# Patient Record
Sex: FEMALE | Race: WHITE | Employment: FULL TIME | URBAN - METROPOLITAN AREA
[De-identification: names, ages, dates, MRNs, and addresses within clinical notes are randomized per-mention and may not be internally consistent; named-entity substitution may affect disease eponyms.]

---

## 2020-11-15 ENCOUNTER — APPOINTMENT (OUTPATIENT)
Dept: CT IMAGING | Age: 55
End: 2020-11-15
Attending: EMERGENCY MEDICINE
Payer: COMMERCIAL

## 2020-11-15 ENCOUNTER — HOSPITAL ENCOUNTER (OUTPATIENT)
Age: 55
Setting detail: OBSERVATION
Discharge: HOME | End: 2020-11-16
Attending: EMERGENCY MEDICINE | Admitting: INTERNAL MEDICINE
Payer: COMMERCIAL

## 2020-11-15 ENCOUNTER — HOSPITAL ENCOUNTER (OUTPATIENT)
Dept: MRI IMAGING | Age: 55
Setting detail: OBSERVATION
Discharge: HOME | End: 2020-11-15
Attending: INTERNAL MEDICINE
Payer: COMMERCIAL

## 2020-11-15 ENCOUNTER — APPOINTMENT (OUTPATIENT)
Dept: NON INVASIVE DIAGNOSTICS | Age: 55
End: 2020-11-15
Attending: INTERNAL MEDICINE
Payer: COMMERCIAL

## 2020-11-15 DIAGNOSIS — R25.2 CRAMPING OF HANDS: ICD-10-CM

## 2020-11-15 DIAGNOSIS — E78.5 DYSLIPIDEMIA: ICD-10-CM

## 2020-11-15 DIAGNOSIS — R47.81 SLURRED SPEECH: Primary | ICD-10-CM

## 2020-11-15 DIAGNOSIS — R20.2 PARESTHESIA: ICD-10-CM

## 2020-11-15 PROBLEM — D72.829 LEUKOCYTOSIS: Status: ACTIVE | Noted: 2020-11-15

## 2020-11-15 PROBLEM — G45.9 TIA (TRANSIENT ISCHEMIC ATTACK): Status: ACTIVE | Noted: 2020-11-15

## 2020-11-15 PROBLEM — R51.9 HEADACHE: Status: ACTIVE | Noted: 2020-11-15

## 2020-11-15 LAB
ALBUMIN SERPL-MCNC: 4.2 G/DL (ref 3.5–5)
ALBUMIN/GLOB SERPL: 1.1 {RATIO} (ref 1.1–2.2)
ALP SERPL-CCNC: 70 U/L (ref 45–117)
ALT SERPL-CCNC: 23 U/L (ref 12–78)
ANION GAP SERPL CALC-SCNC: 9 MMOL/L (ref 5–15)
AST SERPL-CCNC: 19 U/L (ref 15–37)
BASOPHILS # BLD: 0.1 K/UL (ref 0–0.1)
BASOPHILS NFR BLD: 0 % (ref 0–1)
BILIRUB SERPL-MCNC: 0.7 MG/DL (ref 0.2–1)
BUN SERPL-MCNC: 14 MG/DL (ref 6–20)
BUN/CREAT SERPL: 18 (ref 12–20)
CALCIUM SERPL-MCNC: 8.9 MG/DL (ref 8.5–10.1)
CHLORIDE SERPL-SCNC: 108 MMOL/L (ref 97–108)
CHOLEST SERPL-MCNC: 236 MG/DL
CO2 SERPL-SCNC: 25 MMOL/L (ref 21–32)
COMMENT, HOLDF: NORMAL
CREAT SERPL-MCNC: 0.79 MG/DL (ref 0.55–1.02)
DIFFERENTIAL METHOD BLD: ABNORMAL
ECHO AO ASC DIAM: 2.82 CM
ECHO AO ROOT DIAM: 2.82 CM
ECHO AV AREA PEAK VELOCITY: 2.89 CM2
ECHO AV AREA VTI: 2.86 CM2
ECHO AV AREA/BSA PEAK VELOCITY: 1.6 CM2/M2
ECHO AV AREA/BSA VTI: 1.6 CM2/M2
ECHO AV MEAN GRADIENT: 6.51 MMHG
ECHO AV PEAK GRADIENT: 13.83 MMHG
ECHO AV PEAK VELOCITY: 185.92 CM/S
ECHO AV VTI: 34.67 CM
ECHO IVC PROX: 1.44 CM
ECHO LA AREA 4C: 13.6 CM2
ECHO LA MAJOR AXIS: 3.08 CM
ECHO LA MINOR AXIS: 1.74 CM
ECHO LA VOL 2C: 38.2 ML (ref 22–52)
ECHO LA VOL 4C: 32.6 ML (ref 22–52)
ECHO LA VOL BP: 38.1 ML (ref 22–52)
ECHO LA VOL/BSA BIPLANE: 21.53 ML/M2 (ref 16–28)
ECHO LA VOLUME INDEX A2C: 21.58 ML/M2 (ref 16–28)
ECHO LA VOLUME INDEX A4C: 18.42 ML/M2 (ref 16–28)
ECHO LV E' LATERAL VELOCITY: 12.58 CENTIMETER/SECOND
ECHO LV E' SEPTAL VELOCITY: 10.62 CENTIMETER/SECOND
ECHO LV INTERNAL DIMENSION DIASTOLIC: 4.07 CM (ref 3.9–5.3)
ECHO LV INTERNAL DIMENSION SYSTOLIC: 2.35 CM
ECHO LV IVSD: 0.91 CM (ref 0.6–0.9)
ECHO LV MASS 2D: 128.7 G (ref 67–162)
ECHO LV MASS INDEX 2D: 72.7 G/M2 (ref 43–95)
ECHO LV POSTERIOR WALL DIASTOLIC: 1.07 CM (ref 0.6–0.9)
ECHO LVOT DIAM: 1.97 CM
ECHO LVOT PEAK GRADIENT: 10.93 MMHG
ECHO LVOT PEAK VELOCITY: 165.27 CM/S
ECHO LVOT SV: 99 ML
ECHO LVOT VTI: 32.5 CM
ECHO MV A VELOCITY: 93.52 CENTIMETER/SECOND
ECHO MV AREA PHT: 5.22 CM2
ECHO MV E DECELERATION TIME (DT): 145.26 MS
ECHO MV E VELOCITY: 80.89 CENTIMETER/SECOND
ECHO MV PRESSURE HALF TIME (PHT): 42.13 MS
ECHO PV MAX VELOCITY: 123.98 CM/S
ECHO PV PEAK INSTANTANEOUS GRADIENT SYSTOLIC: 6.16 MMHG
ECHO RV INTERNAL DIMENSION: 3.05 CM
ECHO RV TAPSE: 2.41 CM (ref 1.5–2)
ECHO TV REGURGITANT MAX VELOCITY: 165.85 CM/S
ECHO TV REGURGITANT PEAK GRADIENT: 11 MMHG
EOSINOPHIL # BLD: 0 K/UL (ref 0–0.4)
EOSINOPHIL NFR BLD: 0 % (ref 0–7)
ERYTHROCYTE [DISTWIDTH] IN BLOOD BY AUTOMATED COUNT: 11.9 % (ref 11.5–14.5)
EST. AVERAGE GLUCOSE BLD GHB EST-MCNC: 114 MG/DL
GLOBULIN SER CALC-MCNC: 3.7 G/DL (ref 2–4)
GLUCOSE BLD STRIP.AUTO-MCNC: 82 MG/DL (ref 65–100)
GLUCOSE SERPL-MCNC: 105 MG/DL (ref 65–100)
HBA1C MFR BLD: 5.6 % (ref 4–5.6)
HCT VFR BLD AUTO: 44.6 % (ref 35–47)
HDLC SERPL-MCNC: 68 MG/DL
HDLC SERPL: 3.5 {RATIO} (ref 0–5)
HGB BLD-MCNC: 15 G/DL (ref 11.5–16)
IMM GRANULOCYTES # BLD AUTO: 0 K/UL (ref 0–0.04)
IMM GRANULOCYTES NFR BLD AUTO: 0 % (ref 0–0.5)
LA VOL DISK BP: 36.21 ML (ref 22–52)
LDLC SERPL CALC-MCNC: 153.6 MG/DL (ref 0–100)
LIPID PROFILE,FLP: ABNORMAL
LVOT MG: 5.33 MMHG
LYMPHOCYTES # BLD: 1.2 K/UL (ref 0.8–3.5)
LYMPHOCYTES NFR BLD: 10 % (ref 12–49)
MAGNESIUM SERPL-MCNC: 1.8 MG/DL (ref 1.6–2.4)
MCH RBC QN AUTO: 31.8 PG (ref 26–34)
MCHC RBC AUTO-ENTMCNC: 33.6 G/DL (ref 30–36.5)
MCV RBC AUTO: 94.7 FL (ref 80–99)
MONOCYTES # BLD: 0.6 K/UL (ref 0–1)
MONOCYTES NFR BLD: 5 % (ref 5–13)
NEUTS SEG # BLD: 10.3 K/UL (ref 1.8–8)
NEUTS SEG NFR BLD: 85 % (ref 32–75)
NRBC # BLD: 0 K/UL (ref 0–0.01)
NRBC BLD-RTO: 0 PER 100 WBC
PLATELET # BLD AUTO: 338 K/UL (ref 150–400)
PMV BLD AUTO: 10.6 FL (ref 8.9–12.9)
POTASSIUM SERPL-SCNC: 3.4 MMOL/L (ref 3.5–5.1)
PROT SERPL-MCNC: 7.9 G/DL (ref 6.4–8.2)
RBC # BLD AUTO: 4.71 M/UL (ref 3.8–5.2)
SAMPLES BEING HELD,HOLD: NORMAL
SERVICE CMNT-IMP: NORMAL
SODIUM SERPL-SCNC: 142 MMOL/L (ref 136–145)
TRIGL SERPL-MCNC: 72 MG/DL (ref ?–150)
TSH SERPL DL<=0.05 MIU/L-ACNC: 1.11 UIU/ML (ref 0.36–3.74)
VLDLC SERPL CALC-MCNC: 14.4 MG/DL
WBC # BLD AUTO: 12.2 K/UL (ref 3.6–11)

## 2020-11-15 PROCEDURE — 99218 HC RM OBSERVATION: CPT

## 2020-11-15 PROCEDURE — 99285 EMERGENCY DEPT VISIT HI MDM: CPT

## 2020-11-15 PROCEDURE — 97161 PT EVAL LOW COMPLEX 20 MIN: CPT

## 2020-11-15 PROCEDURE — 70450 CT HEAD/BRAIN W/O DYE: CPT

## 2020-11-15 PROCEDURE — 97530 THERAPEUTIC ACTIVITIES: CPT

## 2020-11-15 PROCEDURE — 83036 HEMOGLOBIN GLYCOSYLATED A1C: CPT

## 2020-11-15 PROCEDURE — 97535 SELF CARE MNGMENT TRAINING: CPT

## 2020-11-15 PROCEDURE — 36415 COLL VENOUS BLD VENIPUNCTURE: CPT

## 2020-11-15 PROCEDURE — 96374 THER/PROPH/DIAG INJ IV PUSH: CPT

## 2020-11-15 PROCEDURE — 97116 GAIT TRAINING THERAPY: CPT

## 2020-11-15 PROCEDURE — 0042T CT PERF W CBF: CPT

## 2020-11-15 PROCEDURE — 95816 EEG AWAKE AND DROWSY: CPT | Performed by: PSYCHIATRY & NEUROLOGY

## 2020-11-15 PROCEDURE — 74011250636 HC RX REV CODE- 250/636: Performed by: INTERNAL MEDICINE

## 2020-11-15 PROCEDURE — 74011250636 HC RX REV CODE- 250/636: Performed by: EMERGENCY MEDICINE

## 2020-11-15 PROCEDURE — 93005 ELECTROCARDIOGRAM TRACING: CPT

## 2020-11-15 PROCEDURE — 85025 COMPLETE CBC W/AUTO DIFF WBC: CPT

## 2020-11-15 PROCEDURE — 97165 OT EVAL LOW COMPLEX 30 MIN: CPT

## 2020-11-15 PROCEDURE — 99205 OFFICE O/P NEW HI 60 MIN: CPT | Performed by: PSYCHIATRY & NEUROLOGY

## 2020-11-15 PROCEDURE — 80061 LIPID PANEL: CPT

## 2020-11-15 PROCEDURE — 96375 TX/PRO/DX INJ NEW DRUG ADDON: CPT

## 2020-11-15 PROCEDURE — 74011000636 HC RX REV CODE- 636: Performed by: RADIOLOGY

## 2020-11-15 PROCEDURE — 70496 CT ANGIOGRAPHY HEAD: CPT

## 2020-11-15 PROCEDURE — 84443 ASSAY THYROID STIM HORMONE: CPT

## 2020-11-15 PROCEDURE — 74011250637 HC RX REV CODE- 250/637: Performed by: INTERNAL MEDICINE

## 2020-11-15 PROCEDURE — 80053 COMPREHEN METABOLIC PANEL: CPT

## 2020-11-15 PROCEDURE — 94760 N-INVAS EAR/PLS OXIMETRY 1: CPT

## 2020-11-15 PROCEDURE — 83735 ASSAY OF MAGNESIUM: CPT

## 2020-11-15 PROCEDURE — 70551 MRI BRAIN STEM W/O DYE: CPT

## 2020-11-15 PROCEDURE — 82962 GLUCOSE BLOOD TEST: CPT

## 2020-11-15 RX ORDER — FAMOTIDINE 20 MG/1
20 TABLET, FILM COATED ORAL EVERY 12 HOURS
Status: DISCONTINUED | OUTPATIENT
Start: 2020-11-15 | End: 2020-11-16 | Stop reason: HOSPADM

## 2020-11-15 RX ORDER — POTASSIUM CHLORIDE 750 MG/1
40 TABLET, FILM COATED, EXTENDED RELEASE ORAL EVERY 4 HOURS
Status: DISPENSED | OUTPATIENT
Start: 2020-11-15 | End: 2020-11-15

## 2020-11-15 RX ORDER — GUAIFENESIN 100 MG/5ML
81 LIQUID (ML) ORAL DAILY
Status: DISCONTINUED | OUTPATIENT
Start: 2020-11-15 | End: 2020-11-16 | Stop reason: HOSPADM

## 2020-11-15 RX ORDER — SODIUM CHLORIDE 9 MG/ML
75 INJECTION, SOLUTION INTRAVENOUS CONTINUOUS
Status: DISCONTINUED | OUTPATIENT
Start: 2020-11-15 | End: 2020-11-16

## 2020-11-15 RX ORDER — LORAZEPAM 2 MG/ML
1 INJECTION INTRAMUSCULAR
Status: COMPLETED | OUTPATIENT
Start: 2020-11-15 | End: 2020-11-15

## 2020-11-15 RX ORDER — ACETAMINOPHEN 325 MG/1
650 TABLET ORAL
Status: DISCONTINUED | OUTPATIENT
Start: 2020-11-15 | End: 2020-11-16 | Stop reason: HOSPADM

## 2020-11-15 RX ORDER — LABETALOL HCL 20 MG/4 ML
5 SYRINGE (ML) INTRAVENOUS
Status: DISCONTINUED | OUTPATIENT
Start: 2020-11-15 | End: 2020-11-16 | Stop reason: HOSPADM

## 2020-11-15 RX ORDER — ACETAMINOPHEN 650 MG/1
650 SUPPOSITORY RECTAL
Status: DISCONTINUED | OUTPATIENT
Start: 2020-11-15 | End: 2020-11-16 | Stop reason: HOSPADM

## 2020-11-15 RX ADMIN — SODIUM CHLORIDE 75 ML/HR: 900 INJECTION, SOLUTION INTRAVENOUS at 09:29

## 2020-11-15 RX ADMIN — IOPAMIDOL 140 ML: 755 INJECTION, SOLUTION INTRAVENOUS at 09:19

## 2020-11-15 RX ADMIN — FAMOTIDINE 20 MG: 20 TABLET, FILM COATED ORAL at 21:20

## 2020-11-15 RX ADMIN — POTASSIUM CHLORIDE 40 MEQ: 750 TABLET, FILM COATED, EXTENDED RELEASE ORAL at 13:30

## 2020-11-15 RX ADMIN — ASPIRIN 81 MG: 81 TABLET, CHEWABLE ORAL at 13:30

## 2020-11-15 RX ADMIN — LORAZEPAM 1 MG: 2 INJECTION INTRAMUSCULAR; INTRAVENOUS at 09:09

## 2020-11-15 NOTE — ED NOTES
TRANSFER - OUT REPORT:    Verbal report given to 5th floor RN (name) on [facility]  being transferred to medical (unit) for routine progression of care       Report consisted of patients Situation, Background, Assessment and   Recommendations(SBAR). Information from the following report(s) SBAR, ED Summary, MAR, Recent Results and Cardiac Rhythm NSR  was reviewed with the receiving nurse. Lines:   Peripheral IV 11/15/20 Right Antecubital (Active)   Site Assessment Clean, dry, & intact 11/15/20 0818   Phlebitis Assessment 0 11/15/20 0818   Infiltration Assessment 0 11/15/20 0818   Dressing Status Clean, dry, & intact 11/15/20 0818   Dressing Type Tape;Transparent 11/15/20 0818   Hub Color/Line Status Pink;Flushed;Patent 11/15/20 0818   Action Taken Blood drawn 11/15/20 0818        Opportunity for questions and clarification was provided. Patient transported with:   Tech     Pt to go to MRI at this time first, MRI to arrange transport to [facility], spoke to MRI tech.
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
## 2020-11-15 NOTE — PROGRESS NOTES
Occupational Therapy    Acknowledge OT orders and completed chart review. Attempted to see patient for OT. Patient currently having ECHO. Will continue to follow for OT services as medically appropriate.       Thank you,    Elvin Turner OTR/L

## 2020-11-15 NOTE — PROGRESS NOTES
CM Note:  Met with pt for d/c planning and provided Obs letter. PTA pt was independent with ADL's and drove. Takes no meds but can use [employer] in [place]. Reason for Admission:   TIA                   RUR Score:          N/A           Plan for utilizing home health: To be determined    PCP: First and Last name:  Lorraine Lynch   Name of Practice:    Are you a current patient: Yes/No:  yes   Approximate date of last visit:   About 1 year ago   Can you participate in a virtual visit with your PCP:  Doesn't know                    Current Advanced Directive/Advance Care Plan: none; her children are her next of kin                         Transition of Care Plan:                    1. Neuro consult  2. PT/OT/SLP consults  3. CM following for any needs  4. Aditya Leblanc, to transport her home at d/c  STACY Foster RN    Care Management Interventions  PCP Verified by CM: Yes  Mode of Transport at Discharge:  Other (see comment)(nazanin)  Transition of Care Consult (CM Consult): Discharge Planning  Physical Therapy Consult: Yes  Occupational Therapy Consult: Yes  Speech Therapy Consult: Yes  Current Support Network: Own Home, Other(lives with her Yoli leblanc)  Confirm Follow Up Transport: Other (see comment)(nazanin)  Discharge Location  Discharge Placement: Unable to determine at this time
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
## 2020-11-15 NOTE — PROGRESS NOTES
PHYSICAL THERAPY EVALUATION/DISCHARGE  Patient: Jayda Jennings (60 y.o. female)  Date: 11/15/2020  Primary Diagnosis: TIA (transient ischemic attack) [G45.9]       Precautions: universal         ASSESSMENT  Based on the objective data described below, the patient presents with independent with ambulation without assistive device and independent with all functional mobility. Reviewed sign and symptoms of stroke with the patient and verbalized understanding. Reviewed all safety precaution and home exercise program with the patient, verbalized understanding, clear to go home per Physical Therapy perspective. Skilled physical therapy is not indicated at this time. Functional Outcome Measure: The patient scored Total: 56/56 on the Roberts Balance Assessment which is indicative of low fall risk. Other factors to consider for discharge: none     Further skilled acute physical therapy is not indicated at this time. PLAN :  Recommendation for discharge: (in order for the patient to meet his/her long term goals)  No skilled physical therapy/ follow up rehabilitation needs identified at this time. This discharge recommendation:  Has been made in collaboration with the attending provider and/or case management    IF patient discharges home will need the following DME: none         SUBJECTIVE:   Patient stated I feel tired form all the test done today.     OBJECTIVE DATA SUMMARY:   HISTORY:    Past Medical History:   Diagnosis Date    Dysmenorrhea     Gross hematuria     Headache(784.0)     Irregular menses     Microscopic hematuria     PONV (postoperative nausea and vomiting)      Past Surgical History:   Procedure Laterality Date    HX CYSTECTOMY      hx ovarian    HX DILATION AND CURETTAGE         Prior level of function: Independent community ambulator without assistive device.   Personal factors and/or comorbidities impacting plan of care:     Home Situation  Home Environment: Private residence  # Steps to Enter: 4  One/Two Story Residence: One story  Living Alone: No  Support Systems: Spouse/Significant Other/Partner  Patient Expects to be Discharged to[de-identified] Private residence  Current DME Used/Available at Home: None  Tub or Shower Type: Tub/Shower combination    EXAMINATION/PRESENTATION/DECISION MAKING:   Critical Behavior:  Neurologic State: Alert  Orientation Level: Oriented X4  Cognition: Appropriate for age attention/concentration, Appropriate decision making, Follows commands     Hearing: Auditory  Auditory Impairment: None    Range Of Motion:  AROM: Within functional limits           PROM: Within functional limits           Strength:    Strength: Within functional limits                    Tone & Sensation:                                  Coordination:  Coordination: Within functional limits  Vision:      Functional Mobility:  Bed Mobility:  Rolling: Independent  Supine to Sit: Independent  Sit to Supine: Independent  Scooting: Independent  Transfers:  Sit to Stand: Independent  Stand to Sit: Independent  Stand Pivot Transfers: Independent                    Balance:   Sitting: Intact  Standing: Intact; Without support  Ambulation/Gait Training:  Distance (ft): 150 Feet (ft)     Ambulation - Level of Assistance: Modified independent     Gait Description (WDL): Within defined limits                       Therapeutic Exercises:    Instructed patient to continue active range of motion exercise on both legs while up on chair or on bed.        Functional Measure  Roberts Balance Test:    Sitting to Standin  Standing Unsupported: 4  Sitting with Back Unsupported: 4  Standing to Sittin  Transfers: 4  Standing Unsupported with Eyes Closed: 4  Standing Unsupported with Feet Together: 4  Reach Forward with Outstretched Arm: 4   Object: 4  Turn to Look Over Shoulders: 4  Turn 360 Degrees: 4  Alternate Foot on Step/Stool: 4  Standing Unsupported One Foot in Front: 4  Stand on One Le  Total: 56/56 56=Maximum possible score;   0-20=High fall risk  21-40=Moderate fall risk   41-56=Low fall risk        Physical Therapy Evaluation Charge Determination   History Examination Presentation Decision-Making   LOW Complexity : Zero comorbidities / personal factors that will impact the outcome / POC LOW Complexity : 1-2 Standardized tests and measures addressing body structure, function, activity limitation and / or participation in recreation  LOW Complexity : Stable, uncomplicated  Other outcome measures ambrosio balance test  LOW       Based on the above components, the patient evaluation is determined to be of the following complexity level: LOW     Pain Ratin/10    Activity Tolerance:   Good    After treatment patient left in no apparent distress:   Supine in bed, Heels elevated for pressure relief, Call bell within reach, Bed / chair alarm activated and Caregiver / family present    COMMUNICATION/EDUCATION:   The patients plan of care was discussed with: Occupational therapist and Registered nurse. Patient was educated regarding her resolved deficit(s) of slurred speech as this relates to her diagnosis of TIA. She demonstrated Excellent understanding as evidenced by recall. Patient and/or family was verbally educated on the BE FAST acronym for signs/symptoms of CVA and TIA. Informed patient to refer to the Stroke Binder for further BE FAST information. All questions answered with patient indicating good understanding. Fall prevention education was provided and the patient/caregiver indicated understanding. Thank you for this referral.  Katheryn Avila, PT,WCC.    Time Calculation: 28 mins

## 2020-11-15 NOTE — ED PROVIDER NOTES
HPI the patient was driving approximately 20 minutes ago when she developed tingling of both hands, followed by spasm of both hands and slurred speech. The symptoms lasted 10 to 15 minutes and slowly resolved. She now admits to having a mild occipital headache. She denies any chest pain, syncope, shortness of breath, recent illness, fever, vomiting or other complaints. She is on no medications. Past Medical History:   Diagnosis Date    Dysmenorrhea     Gross hematuria     Headache(784.0)     Irregular menses     Microscopic hematuria     PONV (postoperative nausea and vomiting)        Past Surgical History:   Procedure Laterality Date    HX CYSTECTOMY      hx ovarian    HX DILATION AND CURETTAGE           Family History:   Problem Relation Age of Onset    Cancer Mother     Colon Cancer Mother     Coronary Artery Disease Father     Cancer Sister        Social History     Socioeconomic History    Marital status:      Spouse name: Not on file    Number of children: Not on file    Years of education: Not on file    Highest education level: Not on file   Occupational History    Not on file   Social Needs    Financial resource strain: Not on file    Food insecurity     Worry: Not on file     Inability: Not on file    Transportation needs     Medical: Not on file     Non-medical: Not on file   Tobacco Use    Smoking status: Former Smoker     Packs/day: 0.50     Types: Cigarettes    Smokeless tobacco: Former User     Quit date: 2/5/1984   Substance and Sexual Activity    Alcohol use:  Yes     Alcohol/week: 0.0 standard drinks    Drug use: No    Sexual activity: Never   Lifestyle    Physical activity     Days per week: Not on file     Minutes per session: Not on file    Stress: Not on file   Relationships    Social connections     Talks on phone: Not on file     Gets together: Not on file     Attends Jew service: Not on file     Active member of club or organization: Not on file Attends meetings of clubs or organizations: Not on file     Relationship status: Not on file    Intimate partner violence     Fear of current or ex partner: Not on file     Emotionally abused: Not on file     Physically abused: Not on file     Forced sexual activity: Not on file   Other Topics Concern    Not on file   Social History Narrative    Not on file         ALLERGIES: Codeine and Penicillins    Review of Systems   Constitutional: Negative for fever. HENT: Negative for voice change. Eyes: Negative for visual disturbance. Respiratory: Negative for cough and shortness of breath. Cardiovascular: Negative for chest pain. Gastrointestinal: Negative for abdominal pain, nausea and vomiting. Genitourinary: Negative for flank pain. Musculoskeletal: Negative for back pain. Skin: Negative for rash. Neurological: Positive for speech difficulty, numbness and headaches. Psychiatric/Behavioral: Negative for confusion. Vitals:    11/15/20 0801   BP: (!) 195/104   Pulse: 92   Resp: 16   Temp: 98 °F (36.7 °C)   SpO2: 99%   Weight: 74.4 kg (164 lb 1.6 oz)   Height: 5' 6\" (1.676 m)            Physical Exam  Constitutional:       General: She is not in acute distress. Appearance: She is well-developed. HENT:      Head: Normocephalic. Eyes:      Pupils: Pupils are equal, round, and reactive to light. Neck:      Musculoskeletal: Normal range of motion. Cardiovascular:      Rate and Rhythm: Normal rate. Heart sounds: No murmur. Pulmonary:      Effort: Pulmonary effort is normal.      Breath sounds: Normal breath sounds. Abdominal:      Palpations: Abdomen is soft. Tenderness: There is no abdominal tenderness. Musculoskeletal: Normal range of motion. Skin:     General: Skin is warm and dry. Capillary Refill: Capillary refill takes less than 2 seconds. Neurological:      Mental Status: She is alert and oriented to person, place, and time. Cranial Nerves:  No cranial nerve deficit. Motor: No weakness. Psychiatric:         Behavior: Behavior normal.          MDM       Procedures    Perfect Serve Consult for Admission  9:01 AM    ED Room Number: ER08/08  Patient Name and age: Deidre Hernandez 60 y.o.  female  Working Diagnosis:   1. Slurred speech    2. Paresthesia        COVID-19 Suspicion:  no  Sepsis present:  no  Reassessment needed: no  Code Status:  Full Code  Readmission: no  Isolation Requirements:  no  Recommended Level of Care:  med/surg  Department:[facility] ED - [phone]  Other:  Pt c spell of slurred speech and numbness /cramping of hands. teleneuro concerned about poss.  Sz, vs near syncope vs demyelinating dz

## 2020-11-16 VITALS
OXYGEN SATURATION: 96 % | DIASTOLIC BLOOD PRESSURE: 72 MMHG | HEIGHT: 66 IN | RESPIRATION RATE: 18 BRPM | SYSTOLIC BLOOD PRESSURE: 123 MMHG | WEIGHT: 150 LBS | HEART RATE: 82 BPM | TEMPERATURE: 98.2 F | BODY MASS INDEX: 24.11 KG/M2

## 2020-11-16 PROBLEM — E78.5 DYSLIPIDEMIA: Status: ACTIVE | Noted: 2020-11-16

## 2020-11-16 LAB
ATRIAL RATE: 75 BPM
CALCULATED P AXIS, ECG09: 75 DEGREES
CALCULATED R AXIS, ECG10: 23 DEGREES
CALCULATED T AXIS, ECG11: 40 DEGREES
DIAGNOSIS, 93000: NORMAL
P-R INTERVAL, ECG05: 150 MS
Q-T INTERVAL, ECG07: 414 MS
QRS DURATION, ECG06: 94 MS
QTC CALCULATION (BEZET), ECG08: 462 MS
VENTRICULAR RATE, ECG03: 75 BPM

## 2020-11-16 PROCEDURE — 74011250636 HC RX REV CODE- 250/636: Performed by: INTERNAL MEDICINE

## 2020-11-16 PROCEDURE — 74011250637 HC RX REV CODE- 250/637: Performed by: INTERNAL MEDICINE

## 2020-11-16 PROCEDURE — 94760 N-INVAS EAR/PLS OXIMETRY 1: CPT

## 2020-11-16 PROCEDURE — 99218 HC RM OBSERVATION: CPT

## 2020-11-16 PROCEDURE — 95816 EEG AWAKE AND DROWSY: CPT | Performed by: PSYCHIATRY & NEUROLOGY

## 2020-11-16 PROCEDURE — 99224 PR SBSQ OBSERVATION CARE/DAY 15 MINUTES: CPT | Performed by: PSYCHIATRY & NEUROLOGY

## 2020-11-16 RX ORDER — ATORVASTATIN CALCIUM 20 MG/1
20 TABLET, FILM COATED ORAL DAILY
Status: DISCONTINUED | OUTPATIENT
Start: 2020-11-16 | End: 2020-11-16 | Stop reason: HOSPADM

## 2020-11-16 RX ORDER — ATORVASTATIN CALCIUM 20 MG/1
20 TABLET, FILM COATED ORAL DAILY
Qty: 30 TAB | Refills: 0 | Status: SHIPPED | OUTPATIENT
Start: 2020-11-17 | End: 2020-12-17

## 2020-11-16 RX ORDER — GUAIFENESIN 100 MG/5ML
81 LIQUID (ML) ORAL DAILY
Qty: 30 TAB | Refills: 0 | Status: SHIPPED | OUTPATIENT
Start: 2020-11-17 | End: 2020-12-17

## 2020-11-16 RX ADMIN — FAMOTIDINE 20 MG: 20 TABLET, FILM COATED ORAL at 08:36

## 2020-11-16 RX ADMIN — ATORVASTATIN CALCIUM 20 MG: 20 TABLET, FILM COATED ORAL at 08:36

## 2020-11-16 RX ADMIN — ASPIRIN 81 MG: 81 TABLET, CHEWABLE ORAL at 08:36

## 2020-11-16 RX ADMIN — SODIUM CHLORIDE 75 ML/HR: 900 INJECTION, SOLUTION INTRAVENOUS at 03:09

## 2020-11-16 NOTE — DISCHARGE INSTRUCTIONS
HOSPITALIST DISCHARGE INSTRUCTIONS    NAME: Harvinder Lala   :  1965   MRN:  [id number]     Date:     2020    ADMIT DATE: 11/15/2020     DISCHARGE DATE: 2020     PRINCIPAL ADMITTING DIAGNOSIS:  TIA (transient ischemic attack) [G45.9]    DISCHARGE DIAGNOSES:  Active Problems:    TIA (transient ischemic attack) (11/15/2020)    Slurred speech (11/15/2020)    Cramping of hands (11/15/2020)    Headache (11/15/2020)    Dyslipidemia (2020)    MEDICATIONS:    · It is important that medications are taken exactly as they are prescribed on the discharge medication instructions and keep them your  in the bottles provided by the pharmacist.   · Keep a list of the medication names, dosages, and times to be taken at all times. · Do not take other medications without consulting your doctor. Recommended diet:  Low fat, Low cholesterol    Recommended activity: Activity as tolerated    Post discharge care:    Notify follow up health care provider or return to the emergency department if you cannot get hold of your doctor if you feel worse or experience symptoms similar to those that brought you to hospital    Follow-up Information     Follow up With Specialties Details Why Contact Info    Bindu Llanos MD Family Medicine Go to for the regular follow up [address] [address] Quintin Carranza MD Neurology Call to schedule follow up and neurology review  [address] [place] [address]  [phone]            Information obtained by :  I understand that if any problems occur once I am at home I am to contact my physician and I understand and acknowledge receipt of the instructions indicated above.                                                                                                                                            Physician's or R.N.'s Signature Date/Time                                                                                                                                              Patient or Representative Signature                                                          Date/Time    [facility] Post Hospital/ED Visit Follow-Up Instructions/Information    You may have an in home follow up visit set up with [facility] or may wish to contact [facility] to set-up a visit:    What are we? [facility] is an in-home urgent care service staffed with emergency trained medical teams. We come to your home in a vehicle stocked with medical supplies and technology. An ER physician is always available if needed. When? As a part of your hospital follow-up, an appointment has been/ or can be set up for us to come see you. Usually, this will be 24-72 hours after you leave the hospital or as needed. [facility] is open 7am-9pm, 7 days a week, 365 days a year, including holidays. Why? We know that you cannot always get to your doctor after being in the hospital and that your doctor is not always available when you need them. Once your workup is complete, we'll call in your prescriptions, update your family doctor, and handle billing with your insurance so you can focus on feeling better, faster without leaving home. How much? We accept most major health insurance plans, including Medicaid, Medicare, and Medicare Advantage [facility], [facility], [facility], and [employer]. We also accept: credit, debit, health savings account (HSA), health reimbursement account (HRA) and flexible spending account (FSA) payments. [facility]'s prices compare to conventional urgent care facilities, but we bring the care to you. How to reach us? Getting care is easy- use our mobile janak ([employer]), website ([email or web address]) or call us [phone].

## 2020-11-16 NOTE — DISCHARGE SUMMARY
Joseluis Ayala [facility] [employer] [facility]  [address] [facility], [address] [place] [address]  Tel: [phone]    Physician Discharge Summary    Patient ID:    Dinora Marley  Age:              60 y.o.    : 1965  MRN:             [id number]     PCP: Emeli Stringer MD     Date of Admission: 11/15/2020    Date of Discharge:  2020    Principal admission Diagnosis:   TIA (transient ischemic attack) [G45.9]    Discharge Diagnoses: Active Problems:    TIA (transient ischemic attack) (11/15/2020)    Slurred speech (11/15/2020)    Cramping of hands (11/15/2020)    Headache (11/15/2020)    Leukocytosis (11/15/2020)    Dyslipidemia (2020)    Consults: Neurology    Hospital Course:     Ms. Amol Medellin is a 60 y.o. admitted to [facility] and treated for the following:    ?TIA (transient ischemic attack) (11/15/2020) / Slurred speech (11/15/2020) / Headache (11/15/2020) POA: CT code neuro, CTA head an neck neg for acute changes. EKG neg for arrhythmia. 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 Echocardiogram showed a normal EF. No shunt. . TSH is normal. Continue Asprin, Lipitor. EEG was normal       Cramping of hands (11/15/2020) POA: likely due to hypokalemia. Repleted. Resolved      Leukocytosis (11/15/2020) POA: likely reactive. No respiratory or other symptoms. No other symptoms.      Elevated BP with no diagnosis of hypertension POA: BPs now normal     Discharge Exam:    Visit Vitals  /72 (BP 1 Location: Left arm, BP Patient Position: At rest)   Pulse 82   Temp 98.2 °F (36.8 °C)   Resp 18   Ht 5' 6\" (1.676 m)   Wt 68 kg (150 lb)   SpO2 96%   BMI 24.21 kg/m²        Patient has been seen and examined.     General: well looking and stable patient in no acute distress  Pulm: clear breath sounds without wheezes  Card: no murmurs, normal S1, S2 without thrills, bruits   Abd: soft, non-tender, normoactive bowel sounds  Skin: no rashes or ulcers, skin turgor is good  Neuro: awake, alert and has a non focal     Activity: Activity as tolerated    Diet: Regular Diet    Current Discharge Medication List      START taking these medications    Details   aspirin 81 mg chewable tablet Take 1 Tab by mouth daily for 30 days. Qty: 30 Tab, Refills: 0      atorvastatin (LIPITOR) 20 mg tablet Take 1 Tab by mouth daily for 30 days. Qty: 30 Tab, Refills: 0             Follow-up Information     Follow up With Specialties Details Why Contact Info    Wyn Cabot, MD Family Medicine Go to for the regular follow up [facility] [address] MD [place] [facility] Call to schedule follow up and neurology review  [address] [place] [address]  [phone]            Follow-up tests or labs: None    Discharge Condition: Stable    Disposition: home    Time taken to arrange discharge:  35 minutes.     Signed:  Ariela Alvarez MD     [facility]  11/16/2020   9:49 AM

## 2020-11-16 NOTE — PROGRESS NOTES
Discharge order noted. Patient provided information on [facility] and Lipitor, new medication. PIV removed. Discharge medications reviewed with patient and appropriate educational materials and side effects teaching were provided. I have reviewed discharge instructions with the patient and the patient verbalized understanding. Leave smart education provided to patient and the patient verbalized understanding. AVS signed and given to patient.

## 2020-11-16 NOTE — PROGRESS NOTES
11/16/20   CARE MANAGEMENT NOTE:  CM reviewed EMR for clinical updates and handoff received from weekend  [place]). Pt was admitted with r/o TIA. Reportedly, pt resides with her fiance in [place], [place]. Dtr Alex Pitt ([phone]) is her primary family contact. RUR N/A. Transition Plan of Care:  1. PT/OT evals complete; pt ambulated 150 feet on 11/15 and no further rehab services needed at this time  2. Neuro w/u - EEG pending  3. Outpt f/u  4. Pt to arrange her own transportation home    CM will continue to follow pt until discharged.   Wade

## 2020-11-16 NOTE — PROGRESS NOTES
Bedside and Verbal shift change report given to Aida Sal RN (oncoming nurse) by Aletha Hearn RN (offgoing nurse). Report included the following information SBAR, Kardex, ED Summary, Intake/Output, MAR and Recent Results.

## 2020-11-16 NOTE — PROGRESS NOTES
Bedside and Verbal shift change report given to [place] (oncoming nurse) by Fannie Maria RN (offgoing nurse). Report included the following information SBAR, Kardex, Intake/Output, MAR, Accordion and Recent Results.

## 2020-11-16 NOTE — PROGRESS NOTES
Stroke Education provided to patient and spouse/SO and the following topics were discussed    1. Patients personal risk factors for stroke are none    2. Warning signs of Stroke:        * Sudden numbness or weakness of the face, arm or leg, especially on one side of          The body            * Sudden confusion, trouble speaking or understanding        * Sudden trouble seeing in one or both eyes        * Sudden trouble walking, dizziness, loss of balance or coordination        * Sudden severe headache with no known cause      3. Importance of activation Emergency Medical Services ( 9-1-1 ) immediately if experience any warning signs of stroke. 4. Be sure and schedule a follow-up appointment with your primary care doctor or any specialists as instructed. 5. You must take medicine every day to treat your risk factors for stroke. Be sure to take your medicines exactly as your doctor tells you: no more, no less. Know what your medicines are for , what they do. Anti-thrombotics /anticoagulants can help prevent strokes. 6.  Smoking and second-hand smoke greatly increase your risk of stroke, cardiovascular disease and death. Smoking history never    7. Information provided was [facility] Stroke Education Binder, Stroke Handouts or Verbal Education    8. Documentation of teaching completed in Patient Education Activity and on Care Plan with teaching response noted?   yes

## 2020-11-16 NOTE — PROCEDURES
Joseluis Ayala [facility]  EEG    Name:  Domenica Barber  MR#:  [id number]  :  1965  ACCOUNT #:  [de-identified]  DATE OF SERVICE:  11/15/2020      REFERRING PROVIDER:  Bishop Lucio Monsalve MD    CLINICAL HISTORY:  An EEG is requested on this 47-year-old woman to evaluate for epileptiform abnormalities. MEDICATIONS:  Medications are not listed. EEG REPORT:  This tracing is obtained during the awake state. During wakefulness, the background consists of diffuse low-voltage fast frequency beta wave activity. Hyperventilation was not performed due to COVID-19 precautions. Intermittent photic stimulation induces symmetric posterior driving responses. Sleep is not attained. IMPRESSION/INTERPRETATION:  This EEG recorded during the awake state is normal.  No epileptiform abnormalities are seen.       Angel Faye MD      [id number]  D:  2020 11:01  T:  2020 11:52  JOB #:  [id number]